# Patient Record
Sex: MALE | Race: WHITE
[De-identification: names, ages, dates, MRNs, and addresses within clinical notes are randomized per-mention and may not be internally consistent; named-entity substitution may affect disease eponyms.]

---

## 2020-09-07 ENCOUNTER — HOSPITAL ENCOUNTER (EMERGENCY)
Dept: HOSPITAL 62 - ER | Age: 50
Discharge: HOME | End: 2020-09-07
Payer: SELF-PAY

## 2020-09-07 VITALS — SYSTOLIC BLOOD PRESSURE: 166 MMHG | DIASTOLIC BLOOD PRESSURE: 104 MMHG

## 2020-09-07 DIAGNOSIS — R07.9: Primary | ICD-10-CM

## 2020-09-07 DIAGNOSIS — R11.0: ICD-10-CM

## 2020-09-07 DIAGNOSIS — F17.200: ICD-10-CM

## 2020-09-07 DIAGNOSIS — K21.9: ICD-10-CM

## 2020-09-07 LAB
ADD MANUAL DIFF: NO
ALBUMIN SERPL-MCNC: 4.4 G/DL (ref 3.5–5)
ALP SERPL-CCNC: 87 U/L (ref 38–126)
ANION GAP SERPL CALC-SCNC: 9 MMOL/L (ref 5–19)
APPEARANCE UR: CLEAR
APTT BLD: 33.3 SEC (ref 23.5–35.8)
APTT PPP: (no result) S
AST SERPL-CCNC: 42 U/L (ref 17–59)
BARBITURATES UR QL SCN: NEGATIVE
BASOPHILS # BLD AUTO: 0.1 10^3/UL (ref 0–0.2)
BASOPHILS NFR BLD AUTO: 0.7 % (ref 0–2)
BILIRUB DIRECT SERPL-MCNC: 0.4 MG/DL (ref 0–0.4)
BILIRUB SERPL-MCNC: 0.8 MG/DL (ref 0.2–1.3)
BILIRUB UR QL STRIP: NEGATIVE
BUN SERPL-MCNC: 11 MG/DL (ref 7–20)
CALCIUM: 9.1 MG/DL (ref 8.4–10.2)
CHLORIDE SERPL-SCNC: 104 MMOL/L (ref 98–107)
CO2 SERPL-SCNC: 26 MMOL/L (ref 22–30)
D DIMER PPP FEU-MCNC: 0.29 UG/ML (ref 0–0.5)
EOSINOPHIL # BLD AUTO: 0.5 10^3/UL (ref 0–0.6)
EOSINOPHIL NFR BLD AUTO: 5.8 % (ref 0–6)
ERYTHROCYTE [DISTWIDTH] IN BLOOD BY AUTOMATED COUNT: 14.2 % (ref 11.5–14)
ETHANOL SERPL-MCNC: < 10 MG/DL
GLUCOSE SERPL-MCNC: 105 MG/DL (ref 75–110)
GLUCOSE UR STRIP-MCNC: NEGATIVE MG/DL
HCT VFR BLD CALC: 45.8 % (ref 37.9–51)
HGB BLD-MCNC: 15.9 G/DL (ref 13.5–17)
INR PPP: 0.91
KETONES UR STRIP-MCNC: NEGATIVE MG/DL
LYMPHOCYTES # BLD AUTO: 2.2 10^3/UL (ref 0.5–4.7)
LYMPHOCYTES NFR BLD AUTO: 25.9 % (ref 13–45)
MCH RBC QN AUTO: 32.1 PG (ref 27–33.4)
MCHC RBC AUTO-ENTMCNC: 34.8 G/DL (ref 32–36)
MCV RBC AUTO: 92 FL (ref 80–97)
METHADONE UR QL SCN: NEGATIVE
MONOCYTES # BLD AUTO: 0.8 10^3/UL (ref 0.1–1.4)
MONOCYTES NFR BLD AUTO: 9.2 % (ref 3–13)
NEUTROPHILS # BLD AUTO: 4.9 10^3/UL (ref 1.7–8.2)
NEUTS SEG NFR BLD AUTO: 58.4 % (ref 42–78)
PCP UR QL SCN: NEGATIVE
PH UR STRIP: 6 [PH] (ref 5–9)
PLATELET # BLD: 186 10^3/UL (ref 150–450)
POTASSIUM SERPL-SCNC: 4.2 MMOL/L (ref 3.6–5)
PROT SERPL-MCNC: 7.1 G/DL (ref 6.3–8.2)
PROT UR STRIP-MCNC: NEGATIVE MG/DL
PROTHROMBIN TIME: 12.5 SEC (ref 11.4–15.4)
RBC # BLD AUTO: 4.96 10^6/UL (ref 4.35–5.55)
SP GR UR STRIP: 1.01
TOTAL CELLS COUNTED % (AUTO): 100 %
URINE AMPHETAMINES SCREEN: NEGATIVE
URINE BENZODIAZEPINES SCREEN: NEGATIVE
URINE COCAINE SCREEN: NEGATIVE
URINE MARIJUANA (THC) SCREEN: NEGATIVE
UROBILINOGEN UR-MCNC: NEGATIVE MG/DL (ref ?–2)
WBC # BLD AUTO: 8.3 10^3/UL (ref 4–10.5)

## 2020-09-07 PROCEDURE — 93010 ELECTROCARDIOGRAM REPORT: CPT

## 2020-09-07 PROCEDURE — 85025 COMPLETE CBC W/AUTO DIFF WBC: CPT

## 2020-09-07 PROCEDURE — 93005 ELECTROCARDIOGRAM TRACING: CPT

## 2020-09-07 PROCEDURE — 84484 ASSAY OF TROPONIN QUANT: CPT

## 2020-09-07 PROCEDURE — 85730 THROMBOPLASTIN TIME PARTIAL: CPT

## 2020-09-07 PROCEDURE — 80053 COMPREHEN METABOLIC PANEL: CPT

## 2020-09-07 PROCEDURE — 36415 COLL VENOUS BLD VENIPUNCTURE: CPT

## 2020-09-07 PROCEDURE — 71045 X-RAY EXAM CHEST 1 VIEW: CPT

## 2020-09-07 PROCEDURE — 80307 DRUG TEST PRSMV CHEM ANLYZR: CPT

## 2020-09-07 PROCEDURE — 85610 PROTHROMBIN TIME: CPT

## 2020-09-07 PROCEDURE — 81001 URINALYSIS AUTO W/SCOPE: CPT

## 2020-09-07 PROCEDURE — 85379 FIBRIN DEGRADATION QUANT: CPT

## 2020-09-07 PROCEDURE — 99285 EMERGENCY DEPT VISIT HI MDM: CPT

## 2020-09-07 PROCEDURE — 83690 ASSAY OF LIPASE: CPT

## 2020-09-07 NOTE — ER DOCUMENT REPORT
ED General





- General


Chief Complaint: Chest Pain


Stated Complaint: CHEST PAIN


Time Seen by Provider: 09/07/20 09:33


Primary Care Provider: 


JOO DAO [Primary Care Provider] - Follow up as needed





- [facility]


Notes: 





Chief complaint: Chest pain





History of present illness: 49-year-old male with no significant prior medical 

history, no regular medications and no known allergies presents for evaluation 

of chest pain.  Patient and his wife are from [facility] and had 

spent the weekend with relatives at the beach.  Patient says that he drinks s

ocially and may have had a little more to drink than usual over the weekend.  He

felt well last night.  He was driving home this morning when he developed what 

he initially perceived to be a severe episode of heartburn.  He says he is 

occasionally had reflux symptoms in the past for which he takes Tums.  He took 

some Tums this morning and really did not get any relief of this.  He says he is

never had an episode of heartburn this bad.  He describes a burning pain in the 

subxiphoid and central chest area.  No radiation.  Mild nausea without vomiting.

 No diaphoresis.  He said he felt weak as though he was going to pass out at one

point although he did not lose consciousness.  No dyspnea.  No hemoptysis.  No 

fever or chills.  Patient states his discomfort was at a 10/10 level initially. 

Currently it is about 2/10.  He denies any known history of cholelithiasis.





Patient is a smoker.  Social alcohol consumption.  Denies any drug use.





No known history of diabetes mellitus, hypertension or hyperlipidemia.





Family history is negative for thromboembolic disease and patient has no 

personal history of thromboembolic disease.





Father has had 2 MIs.





Patient works as a .  He denies any recent injuries.  He denies any 

lower extremity edema.  His discomfort is nonpleuritic.





HEART Score:





HISTORY 0


      


ECG 0


      


AGE 1


      


RISK FACTORS 2


   


TROPONIN 0   





TOTAL: 3





 If HEART score is = 3 AND both tronponin measurments are normal, the 30 day 

risk of a major adverse cardiac event (all-cause mortality, myocardia infarction

or need for coronary revscularization) is < 1% (Sensitivity 100%, %).














- Related Data


Allergies/Adverse Reactions: 


                                        





No Known Allergies Allergy (Unverified 09/07/20 09:37)


   











Past Medical History





- General


Information source: Patient, Relative





- Social History


Smoking Status: Current Every Day Smoker


Frequency of alcohol use: Occasional


Drug Abuse: None


Occupation: 


Lives with: Family


Family History: CAD


Patient has homicidal ideation: No





- Past Medical History


Cardiac Medical History: Reports: None


Pulmonary Medical History: Reports: None


EENT Medical History: Reports: None


Neurological Medical History: Reports: None


Endocrine Medical History: Reports: None


Renal/ Medical History: Reports: None


Malignancy Medical History: Reports None


GI Medical History: Reports: Hx Gastroesophageal Reflux Disease


Musculoskeletal Medical History: Reports None


Skin Medical History: Reports None


Psychiatric Medical History: Reports: None


Surgical Hx: Negative





Review of Systems





- Review of Systems


Notes: 





Constitutional: Negative for fever.


HENT: Negative for sore throat.


Eyes: Negative for visual changes.


Cardiovascular: As per HPI.


Respiratory: Negative for shortness of breath.


Gastrointestinal: As per HPI.


Genitourinary: Negative for dysuria.


Musculoskeletal: Negative for back pain.


Skin: Negative for rash.


Neurological: Negative for headaches, weakness or numbness.





10 point ROS negative except as marked above and in HPI.








Physical Exam





- Vital signs


Vitals: 


                                        











Temp Pulse Resp BP Pulse Ox


 


 97.7 F   80   20   167/105 H  98 


 


 09/07/20 09:22  09/07/20 09:22  09/07/20 09:22  09/07/20 09:22  09/07/20 09:22














- Notes


Notes: 











GENERAL: Mildly obese male of approximately stated age appearing mildly anxious.





SKIN: Good turgor no rashes.





HEAD: Normocephalic atraumatic.





EYES: PERRLA.  EOMI.  Conjunctivae and sclerae clear.





EARS: CANALS AND TMS CLEAR.





NOSE: CLEAR.





MOUTH: Moist mucosa.  Good dentition.  No stridor or edema.  No drooling.





NECK: Supple.  No masses or thyromegaly.  No adenopathy.  Carotids 2+ without 

bruits.  No JVD.





BACK: Symmetrical without tenderness.





CHEST: Respirations unlabored.  Breath sounds clear and symmetrical.





HEART: Regular rhythm.  No murmur gallop or rub.





ABDOMEN: Mild obesity.  Soft nontender without masses, organomegaly or rebound. 

Bowel sounds normally active.  No bruits.





GENITALIA: Deferred.





EXTREMITIES: No edema.  No calf tenderness.  Cap refill less than 1.5 seconds.  

Dorsalis pedis and posterior tibial pulses 3+ and symmetrical.





NEUROLOGICAL: GCS 15.  Alert and oriented x3.  Fluent speech.  Cranial nerves II

through XII intact.  Sensorimotor and cerebellar normal.  Normal tone.





PSYCHIATRIC: Mildly anxious affect.





Course





- Re-evaluation


Re-evalutation: 





09/07/20 14:46


Symptoms are promptly relieved by liquid antacid.  Patient is a heart score of 

3.  His EKG x2 here is normal.  He has 2 normal troponins 3 hours apart.  He 

does have some significant risk factors including a family history, smoking and 

obesity.  His blood pressure is marginally elevated here although he is not 

being treated for hypertension.  D-dimer was negative.





Patient remains asymptomatic at this time.  Current blood pressure is 140/88.  

Second troponin was negative.





I advised this man that he should stop smoking.  Weight loss is advised.  I am 

going to put him on some Protonix.  He should seek medical attention immediately

for red flag symptoms.  He understands that he needs to see a cardiologist and 

have an outpatient treadmill test this week.  He and his wife are traveling from

out of town and they are on the way back home now.





Findings, clinical impression and plan of treatment have been discussed with 

patient/family.  Understanding of current findings and recommendations has been 

acknowledged by them and there is agreement regarding disposition and follow-up.





- Vital Signs


Vital signs: 


                                        











Temp Pulse Resp BP Pulse Ox


 


 97.7 F   80   17   149/88 H  98 


 


 09/07/20 09:27  09/07/20 09:22  09/07/20 14:31  09/07/20 14:31  09/07/20 14:31














- Laboratory


Result Diagrams: 


                                 09/07/20 10:25





                                 09/07/20 10:25


Laboratory results interpreted by me: 


                                        











  09/07/20 09/07/20





  10:25 10:25


 


RDW  14.2 H 


 


Urine Blood   SMALL H














- Diagnostic Test


Radiology reviewed: Reports reviewed - Portable chest x-ray per radiologist: 

Normal study.





- EKG Interpretation by Me


Additional EKG results interpreted by me: 





09/07/20 10:22


Twelve-lead EKG from 0914 hrs. reviewed contemporaneously by me.


Indication for study: Chest pain


Normal sinus rhythm.


Rate 78.


Normal intervals.


Normal axis +30 degrees.


No acute ST/T wave changes.





Interpretation: Normal sinus rhythm.





Discharge





- Discharge


Clinical Impression: 


 Chest pain, Gastroesophageal reflux





Condition: Stable


Disposition: HOME, SELF-CARE


Instructions:  Chest Pain of Unclear Cause ([facility]), Reflux Disease (GERD) ([facility])


Additional Instructions: 


Take prescribed medication.





Stop smoking.





Reduction of your weight is advised.





See your primary care physician within the next 24 to 48 hours and he will need 

referral to a cardiologist for a treadmill test this week.





Return to emergency department for new or worsening symptoms:





Pain that is worsening or unimproved


Severe shortness of breath


Uncontrolled vomiting


High fever or shaking chills


Overall worsening


Prescriptions: 


Pantoprazole Sodium [Protonix 40 mg Dr Tablet] 40 mg PO QAMPM 15 Days #30 

tablet.dr


Forms:  Smoking Cessation Education, Elevated Blood Pressure, Return to Work


Referrals: 


LOCALMD,NO [Primary Care Provider] - Follow up as needed

## 2020-09-07 NOTE — RADIOLOGY REPORT (SQ)
EXAM DESCRIPTION:  CHEST SINGLE VIEW



IMAGES COMPLETED DATE/TIME:  9/7/2020 10:21 am



REASON FOR STUDY:  cp



COMPARISON:  None.



NUMBER OF VIEWS:  One view.



TECHNIQUE:  Single frontal radiographic view of the chest acquired.



LIMITATIONS:  None.



FINDINGS:  LUNGS AND PLEURA: No opacities, masses or pneumothorax. No pleural effusion.

MEDIASTINUM AND HILAR STRUCTURES: No masses.  Contour normal.

HEART AND VASCULAR STRUCTURES: Heart normal in size.  Normal vasculature.

BONES: No acute findings.

HARDWARE: None in the chest.

OTHER: No other significant finding.



IMPRESSION:  NO SIGNIFICANT RADIOGRAPHIC FINDING IN THE CHEST.



TECHNICAL DOCUMENTATION:  JOB ID:  [id number]

 2011 [employer]- All Rights Reserved



Reading location - IP/workstation name: [email or web address]